# Patient Record
Sex: MALE | Race: BLACK OR AFRICAN AMERICAN | NOT HISPANIC OR LATINO | Employment: PART TIME | URBAN - METROPOLITAN AREA
[De-identification: names, ages, dates, MRNs, and addresses within clinical notes are randomized per-mention and may not be internally consistent; named-entity substitution may affect disease eponyms.]

---
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
## 2018-09-05 ENCOUNTER — OFFICE VISIT (OUTPATIENT)
Dept: INTERNAL MEDICINE | Facility: CLINIC | Age: 18
End: 2018-09-05

## 2018-09-05 VITALS
HEIGHT: 73 IN | HEART RATE: 66 BPM | SYSTOLIC BLOOD PRESSURE: 120 MMHG | DIASTOLIC BLOOD PRESSURE: 76 MMHG | WEIGHT: 171 LBS | BODY MASS INDEX: 22.66 KG/M2 | OXYGEN SATURATION: 99 %

## 2018-09-05 DIAGNOSIS — Z11.3 SCREENING EXAMINATION FOR STD (SEXUALLY TRANSMITTED DISEASE): Primary | ICD-10-CM

## 2018-09-05 LAB
HCV AB SER DONR QL: NORMAL
HIV1 P24 AG SER QL: NORMAL
HIV1+2 AB SER QL: NORMAL

## 2018-09-05 PROCEDURE — G0432 EIA HIV-1/HIV-2 SCREEN: HCPCS | Performed by: NURSE PRACTITIONER

## 2018-09-05 PROCEDURE — 87899 AGENT NOS ASSAY W/OPTIC: CPT | Performed by: NURSE PRACTITIONER

## 2018-09-05 PROCEDURE — 99213 OFFICE O/P EST LOW 20 MIN: CPT | Performed by: NURSE PRACTITIONER

## 2018-09-05 PROCEDURE — 86803 HEPATITIS C AB TEST: CPT | Performed by: NURSE PRACTITIONER

## 2018-09-05 NOTE — PROGRESS NOTES
Subjective   Lake Goel is a 17 y.o. male.     He is here requesting STD panel. He denies any penile discharge. He does have new sexual partner. He is not aware of any exposure to STD>          The following portions of the patient's history were reviewed and updated as appropriate: allergies, current medications, past family history, past medical history, past social history, past surgical history and problem list.    Review of Systems   Constitutional: Negative for activity change, appetite change, fatigue and fever.   Respiratory: Negative for cough, shortness of breath and wheezing.    Cardiovascular: Negative for chest pain, palpitations and leg swelling.   Genitourinary: Negative for discharge, frequency, penile pain, penile swelling, scrotal swelling and testicular pain.       Objective   Physical Exam   Constitutional: He is oriented to person, place, and time. He appears well-developed and well-nourished.   HENT:   Head: Normocephalic.   Nose: Nose normal.   Cardiovascular: Regular rhythm and normal heart sounds.  Exam reveals no S3 and no S4.    No murmur heard.  Pulmonary/Chest: Effort normal and breath sounds normal. He has no decreased breath sounds. He has no wheezes. He has no rhonchi. He has no rales.   Musculoskeletal: He exhibits no edema.   Neurological: He is alert and oriented to person, place, and time. Gait normal.   Skin: Skin is warm and dry.   Psychiatric: He has a normal mood and affect.       Assessment/Plan   Lake was seen today for discuss with provider.    Diagnoses and all orders for this visit:    Screening examination for STD (sexually transmitted disease)  -     Chlamydia trachomatis, Neisseria gonorrhoeae, PCR - Urine, Urine, Clean Catch; Future  -     HIV-1 / O / 2 Ag / Antibody 4th Generation; Future  -     Hepatitis C Antibody; Future  -     HSV 1 & 2 - Specific Antibody, IgG; Future  -     RPR, Rfx Qn RPR / Confirm TP; Future  -     Chlamydia trachomatis, Neisseria  gonorrhoeae, PCR - Urine, Urine, Clean Catch  -     HIV-1 / O / 2 Ag / Antibody 4th Generation  -     Hepatitis C Antibody  -     HSV 1 & 2 - Specific Antibody, IgG  -     RPR, Rfx Qn RPR / Confirm TP    He will need to return for physical with fasting labs.

## 2018-09-06 LAB
HSV1 IGG SER IA-ACNC: <0.91 INDEX (ref 0–0.9)
HSV2 IGG SER IA-ACNC: <0.91 INDEX (ref 0–0.9)
RPR SER QL: NON REACTIVE

## 2018-09-07 DIAGNOSIS — A74.9 CHLAMYDIA: Primary | ICD-10-CM

## 2018-09-07 LAB
C TRACH RRNA SPEC DONR QL NAA+PROBE: POSITIVE
N GONORRHOEA DNA SPEC QL NAA+PROBE: NEGATIVE

## 2018-09-07 RX ORDER — AZITHROMYCIN 500 MG/1
1000 TABLET, FILM COATED ORAL ONCE
Qty: 2 TABLET | Refills: 0 | Status: SHIPPED | OUTPATIENT
Start: 2018-09-07 | End: 2018-09-07

## 2018-09-07 RX ORDER — DOXYCYCLINE HYCLATE 100 MG/1
100 CAPSULE ORAL 2 TIMES DAILY
Qty: 14 CAPSULE | Refills: 0 | Status: SHIPPED | OUTPATIENT
Start: 2018-09-07 | End: 2018-09-14

## 2018-09-11 ENCOUNTER — TELEPHONE (OUTPATIENT)
Dept: INTERNAL MEDICINE | Facility: CLINIC | Age: 18
End: 2018-09-11

## 2018-09-11 NOTE — TELEPHONE ENCOUNTER
Pt informed of positive chlamydia.  I called in Zithromax and Doxycycline to [facility] on [address] and [address] per pt request.  Will cancel orders at [facility].

## 2019-04-03 ENCOUNTER — OFFICE VISIT (OUTPATIENT)
Dept: INTERNAL MEDICINE | Facility: CLINIC | Age: 19
End: 2019-04-03

## 2019-04-03 VITALS
DIASTOLIC BLOOD PRESSURE: 80 MMHG | WEIGHT: 176 LBS | SYSTOLIC BLOOD PRESSURE: 116 MMHG | BODY MASS INDEX: 23.22 KG/M2 | TEMPERATURE: 98.6 F

## 2019-04-03 DIAGNOSIS — R36.9 PENILE DISCHARGE: Primary | ICD-10-CM

## 2019-04-03 PROCEDURE — 99213 OFFICE O/P EST LOW 20 MIN: CPT | Performed by: NURSE PRACTITIONER

## 2019-04-03 RX ORDER — DOXYCYCLINE HYCLATE 100 MG/1
100 CAPSULE ORAL 2 TIMES DAILY
Qty: 14 CAPSULE | Refills: 0 | Status: SHIPPED | OUTPATIENT
Start: 2019-04-03 | End: 2019-04-10

## 2019-04-03 RX ORDER — AZITHROMYCIN 500 MG/1
1000 TABLET, FILM COATED ORAL ONCE
Qty: 2 TABLET | Refills: 0 | Status: SHIPPED | OUTPATIENT
Start: 2019-04-03 | End: 2019-04-03

## 2019-04-03 NOTE — PROGRESS NOTES
Subjective   Lake Goel is a 18 y.o. male.     His last STD panel in September 2018.       Penile Discharge   The patient's primary symptoms include penile discharge. The patient's pertinent negatives include no genital injury, genital itching, genital lesions, pelvic pain, penile pain, priapism, scrotal swelling or testicular pain. This is a new problem. The current episode started today. Pertinent negatives include no chest pain, coughing, diarrhea, discolored urine, fever, frequency, hematuria, hesitancy, painful intercourse, shortness of breath, urgency, urinary retention or vomiting. Associated symptoms comments: Last intercourse 2 days ago with new partner, unsure of STD exposure . The penile discharge was thick and white. Nothing aggravates the symptoms. He is sexually active. Condom Use: condom broke  It is unknown whether or not his partner has an STD.        The following portions of the patient's history were reviewed and updated as appropriate: allergies, current medications, past social history and problem list.    Review of Systems   Constitutional: Negative for activity change, appetite change, fatigue and fever.   Respiratory: Negative for cough, shortness of breath and wheezing.    Cardiovascular: Negative for chest pain, palpitations and leg swelling.   Gastrointestinal: Negative for diarrhea and vomiting.   Genitourinary: Positive for discharge. Negative for frequency, hesitancy, pelvic pain, penile pain, scrotal swelling, testicular pain and urgency.       Objective   Physical Exam   Constitutional: He is oriented to person, place, and time. He appears well-developed and well-nourished.   HENT:   Head: Normocephalic.   Nose: Nose normal.   Cardiovascular: Regular rhythm and normal heart sounds. Exam reveals no S3 and no S4.   No murmur heard.  Pulmonary/Chest: Effort normal and breath sounds normal. He has no decreased breath sounds. He has no wheezes. He has no rhonchi. He has no rales.    Musculoskeletal: He exhibits no edema.   Neurological: He is alert and oriented to person, place, and time. Gait normal.   Skin: Skin is warm and dry.   Psychiatric: He has a normal mood and affect.       Assessment/Plan   Lake was seen today for exposure to std.    Diagnoses and all orders for this visit:    Penile discharge  -     Chlamydia trachomatis, Neisseria gonorrhoeae, PCR - Urine, Urine, Clean Catch; Future  -     azithromycin (ZITHROMAX) 500 MG tablet; Take 2 tablets by mouth 1 (One) Time for 1 dose.  -     doxycycline (VIBRAMYCIN) 100 MG capsule; Take 1 capsule by mouth 2 (Two) Times a Day for 7 days.  -     Chlamydia trachomatis, Neisseria gonorrhoeae, PCR - Urine, Urine, Clean Catch    He has been advised to inform any sexual partners of potential for STD. He was advised to use condoms with any intercourse.

## 2019-04-03 NOTE — PATIENT INSTRUCTIONS
Chlamydia, Male  Chlamydia is an STD (sexually transmitted disease). It is a bacterial infection that spreads through sexual contact (is contagious). Chlamydia can occur in different areas of the body, including the tube that moves urine from the bladder out of the body (urethra), the throat, or the rectum. This condition is not difficult to treat. However, if left untreated, chlamydia can lead to more serious health problems.  What are the causes?  Chlamydia is caused by the bacteria Chlamydia trachomatis. It is passed from an infected partner during sexual activity. Chlamydia can spread through contact with the genitals, mouth, or rectum.  What are the signs or symptoms?  In some cases, there may not be any symptoms for this condition (asymptomatic), especially early in the infection. If symptoms develop, they may include:  · Burning when urinating.  · Urinating frequently.  · Pain or swelling in the testicles.  · Watery, mucus-like discharge from the penis.  · Redness, soreness, and swelling (inflammation) of the rectum.  · Bleeding or discharge from the rectum.  · Abdominal pain.  · Itching, burning, or redness in the eyes, or discharge from the eyes.    How is this diagnosed?  This condition may be diagnosed based on:  · Urine tests.  · Swab tests. Depending on your symptoms, your health care provider may use a cotton swab to collect discharge from your urethra or rectum to test for the bacteria.    How is this treated?  This condition is treated with antibiotic medicines.  Follow these instructions at home:  Medicines  · Take over-the-counter and prescription medicines only as told by your health care provider.  · Take your antibiotic medicine as told by your health care provider. Do not stop taking the antibiotic even if you start to feel better.  Sexual activity  · Tell sexual partners about your infection. This includes any oral, anal, or vaginal sex partners you have had within 60 days of when your  symptoms started. Sexual partners should also be treated, even if they have no signs of the disease.  · Do not have sex until you and your sexual partners have completed treatment and your health care provider says it is okay. If your health care provider prescribed you a single dose treatment, wait 7 days after taking the treatment before having sex.  General instructions  · It is your responsibility to get your test results. Ask your health care provider, or the department performing the test, when your results will be ready.  · Get plenty of rest.  · Eat a healthy, well-balanced diet.  · Drink enough fluids to keep your urine clear or pale yellow.  · Keep all follow-up visits as told by your health care provider. This is important. You may need to be tested for infection again 3 months after treatment.  How is this prevented?  The only sure way to prevent chlamydia is to avoid sexual intercourse. However, you can lower your risk by:  · Using latex condoms correctly every time you have sexual intercourse.  · Not having multiple sexual partners.  · Asking if your sexual partner has been tested for STIs and had negative results.    Contact a health care provider if:  · You develop new symptoms or your symptoms do not get better after completing treatment.  · You have a fever or chills.  · You have pain during sexual intercourse.  · You develop new joint pain or swelling near your joints.  · You have pain or soreness in your testicles.  Get help right away if:  · Your pain gets worse and does not get better with medicine.  · You have abnormal discharge.  · You develop flu-like symptoms, such as night sweats, sore throat, or muscle aches.  Summary  · Chlamydia is an STD (sexually transmitted disease). It is a bacterial infection that spreads (is contagious) through sexual contact.  · This condition is not difficult to treat, however, if left untreated, it can lead to more serious health problems.  · In some cases,  there may not be any symptoms for this condition (asymptomatic).  · This condition is treated with antibiotic medicines.  · Using latex condoms correctly every time you have sexual intercourse can help prevent chlamydia.  This information is not intended to replace advice given to you by your health care provider. Make sure you discuss any questions you have with your health care provider.  Document Released: 12/18/2006 Document Revised: 12/04/2017 Document Reviewed: 12/04/2017  [employer] Interactive Patient Education © 2019 [employer]

## 2019-04-05 LAB
C TRACH RRNA SPEC DONR QL NAA+PROBE: NEGATIVE
N GONORRHOEA DNA SPEC QL NAA+PROBE: POSITIVE